# Patient Record
Sex: FEMALE | Race: BLACK OR AFRICAN AMERICAN | NOT HISPANIC OR LATINO | URBAN - METROPOLITAN AREA
[De-identification: names, ages, dates, MRNs, and addresses within clinical notes are randomized per-mention and may not be internally consistent; named-entity substitution may affect disease eponyms.]

---

## 2023-04-18 ENCOUNTER — APPOINTMENT (OUTPATIENT)
Dept: LAB | Facility: LAB | Age: 38
End: 2023-04-18
Payer: OTHER GOVERNMENT

## 2023-04-18 LAB
ABO GROUP (TYPE) IN BLOOD: NORMAL
ANTIBODY SCREEN: NORMAL
ERYTHROCYTE DISTRIBUTION WIDTH (RATIO) BY AUTOMATED COUNT: 14.6 % (ref 11.5–14.5)
ERYTHROCYTE MEAN CORPUSCULAR HEMOGLOBIN CONCENTRATION (G/DL) BY AUTOMATED: 30.8 G/DL (ref 32–36)
ERYTHROCYTE MEAN CORPUSCULAR VOLUME (FL) BY AUTOMATED COUNT: 86 FL (ref 80–100)
ERYTHROCYTES (10*6/UL) IN BLOOD BY AUTOMATED COUNT: 3.98 X10E12/L (ref 4–5.2)
ESTIMATED AVERAGE GLUCOSE FOR HBA1C: 88 MG/DL
FERRITIN, PREGNANCY: 22 UG/L
FOLATE, SERUM, PREGNANCY: 11.9 NG/ML
HEMATOCRIT (%) IN BLOOD BY AUTOMATED COUNT: 34.1 % (ref 36–46)
HEMOGLOBIN (G/DL) IN BLOOD: 10.5 G/DL (ref 12–16)
HEMOGLOBIN A1C/HEMOGLOBIN TOTAL IN BLOOD: 4.7 %
HEPATITIS B VIRUS SURFACE AG PRESENCE IN SERUM: NONREACTIVE
HEPATITIS C VIRUS AB PRESENCE IN SERUM: NONREACTIVE
HIV 1/ 2 AG/AB SCREEN: NONREACTIVE
IRON (UG/DL) IN SER/PLAS IN PREGNANCY: 41 UG/DL
IRON BINDING CAPACITY (UG/DL) IN PREGNANCY: 452 UG/DL
IRON SATURATION (%) IN PREGNANCY: 9 %
LEUKOCYTES (10*3/UL) IN BLOOD BY AUTOMATED COUNT: 9.5 X10E9/L (ref 4.4–11.3)
NRBC (PER 100 WBCS) BY AUTOMATED COUNT: 0 /100 WBC (ref 0–0)
PLATELETS (10*3/UL) IN BLOOD AUTOMATED COUNT: 332 X10E9/L (ref 150–450)
REFLEX ADDED, ANEMIA PANEL: ABNORMAL
RH FACTOR: NORMAL
RUBELLA VIRUS IGG AB: NEGATIVE
SYPHILIS TOTAL AB: NONREACTIVE
VITAMIN B12, PREGNANCY: 218 PG/ML

## 2023-04-19 LAB
CHLAMYDIA TRACH., AMPLIFIED: NEGATIVE
N. GONORRHEA, AMPLIFIED: NEGATIVE
TRICHOMONAS VAGINALIS: NEGATIVE
URINE CULTURE: NORMAL

## 2023-04-20 LAB
HEMOGLOBIN A2: 2.8 %
HEMOGLOBIN A: 97 %
HEMOGLOBIN F: 0.2 %
HEMOGLOBIN IDENTIFICATION INTERPRETATION: NORMAL
PATH REVIEW-HGB IDENTIFICATION: NORMAL

## 2023-05-15 LAB
ERYTHROCYTE DISTRIBUTION WIDTH (RATIO) BY AUTOMATED COUNT: 14.5 % (ref 11.5–14.5)
ERYTHROCYTE MEAN CORPUSCULAR HEMOGLOBIN CONCENTRATION (G/DL) BY AUTOMATED: 30.8 G/DL (ref 32–36)
ERYTHROCYTE MEAN CORPUSCULAR VOLUME (FL) BY AUTOMATED COUNT: 86 FL (ref 80–100)
ERYTHROCYTES (10*6/UL) IN BLOOD BY AUTOMATED COUNT: 3.96 X10E12/L (ref 4–5.2)
FERRITIN, PREGNANCY: 21 UG/L
HEMATOCRIT (%) IN BLOOD BY AUTOMATED COUNT: 34.1 % (ref 36–46)
HEMOGLOBIN (G/DL) IN BLOOD: 10.5 G/DL (ref 12–16)
IRON (UG/DL) IN SER/PLAS IN PREGNANCY: 49 UG/DL
IRON BINDING CAPACITY (UG/DL) IN PREGNANCY: >499 UG/DL
IRON SATURATION (%) IN PREGNANCY: ABNORMAL %
LEUKOCYTES (10*3/UL) IN BLOOD BY AUTOMATED COUNT: 9.5 X10E9/L (ref 4.4–11.3)
NRBC (PER 100 WBCS) BY AUTOMATED COUNT: 0 /100 WBC (ref 0–0)
PLATELETS (10*3/UL) IN BLOOD AUTOMATED COUNT: 342 X10E9/L (ref 150–450)
REFLEX ADDED, ANEMIA PANEL: ABNORMAL
SYPHILIS TOTAL AB: NONREACTIVE

## 2023-05-16 LAB
FOLATE, SERUM, PREGNANCY: 8.8 NG/ML
VITAMIN B12, PREGNANCY: 222 PG/ML

## 2023-06-15 LAB
ERYTHROCYTE DISTRIBUTION WIDTH (RATIO) BY AUTOMATED COUNT: 15.5 % (ref 11.5–14.5)
ERYTHROCYTE MEAN CORPUSCULAR HEMOGLOBIN CONCENTRATION (G/DL) BY AUTOMATED: 31 G/DL (ref 32–36)
ERYTHROCYTE MEAN CORPUSCULAR VOLUME (FL) BY AUTOMATED COUNT: 85 FL (ref 80–100)
ERYTHROCYTES (10*6/UL) IN BLOOD BY AUTOMATED COUNT: 4.02 X10E12/L (ref 4–5.2)
FERRITIN, PREGNANCY: 12 UG/L
FOLATE, SERUM, PREGNANCY: 11 NG/ML
HEMATOCRIT (%) IN BLOOD BY AUTOMATED COUNT: 34.2 % (ref 36–46)
HEMOGLOBIN (G/DL) IN BLOOD: 10.6 G/DL (ref 12–16)
IRON (UG/DL) IN SER/PLAS IN PREGNANCY: 156 UG/DL
IRON BINDING CAPACITY (UG/DL) IN PREGNANCY: 498 UG/DL
IRON SATURATION (%) IN PREGNANCY: 31 %
LEUKOCYTES (10*3/UL) IN BLOOD BY AUTOMATED COUNT: 9.9 X10E9/L (ref 4.4–11.3)
NRBC (PER 100 WBCS) BY AUTOMATED COUNT: 0 /100 WBC (ref 0–0)
PLATELETS (10*3/UL) IN BLOOD AUTOMATED COUNT: 313 X10E9/L (ref 150–450)
REFLEX ADDED, ANEMIA PANEL: ABNORMAL
VITAMIN B12, PREGNANCY: 218 PG/ML

## 2023-08-02 LAB — GROUP B STREP SCREEN: NORMAL

## 2023-10-05 PROBLEM — D64.9 ANEMIA: Status: ACTIVE | Noted: 2023-10-05

## 2023-10-05 PROBLEM — R73.09 ABNORMAL GLUCOSE: Status: ACTIVE | Noted: 2023-10-05

## 2023-10-05 PROBLEM — O09.32 LATE PRENATAL CARE IN SECOND TRIMESTER (HHS-HCC): Status: ACTIVE | Noted: 2023-10-05

## 2023-10-05 PROBLEM — Z34.90 PREGNANCY (HHS-HCC): Status: ACTIVE | Noted: 2023-10-05

## 2023-10-05 PROBLEM — Z3A.25: Status: ACTIVE | Noted: 2023-10-05

## 2023-10-05 PROBLEM — O09.529 AMA (ADVANCED MATERNAL AGE) MULTIGRAVIDA 35+ (HHS-HCC): Status: ACTIVE | Noted: 2023-10-05

## 2023-10-05 PROBLEM — Z86.39: Status: ACTIVE | Noted: 2023-10-05

## 2023-10-05 PROBLEM — O24.419 GESTATIONAL DIABETES (HHS-HCC): Status: ACTIVE | Noted: 2023-10-05

## 2023-10-05 PROBLEM — O24.414 INSULIN CONTROLLED GESTATIONAL DIABETES MELLITUS (GDM) DURING PREGNANCY, ANTEPARTUM (HHS-HCC): Status: ACTIVE | Noted: 2023-10-05

## 2023-10-05 PROBLEM — Z87.59 HISTORY OF SPONTANEOUS ABORTION: Status: ACTIVE | Noted: 2023-10-05

## 2023-10-05 PROBLEM — R87.622 LGSIL PAP SMEAR OF VAGINA: Status: ACTIVE | Noted: 2023-10-05

## 2023-10-05 RX ORDER — ACETAMINOPHEN 500 MG
TABLET ORAL
COMMUNITY

## 2023-10-05 RX ORDER — BLOOD SUGAR DIAGNOSTIC
STRIP MISCELLANEOUS 4 TIMES DAILY
COMMUNITY

## 2023-10-05 RX ORDER — PEN NEEDLE, DIABETIC 30 GX3/16"
NEEDLE, DISPOSABLE MISCELLANEOUS
COMMUNITY

## 2023-10-05 RX ORDER — PRENATAL VIT NO.126/IRON/FOLIC 28MG-0.8MG
TABLET ORAL
COMMUNITY

## 2023-10-05 RX ORDER — ISOPROPYL ALCOHOL 70 ML/100ML
SWAB TOPICAL
COMMUNITY

## 2023-10-05 RX ORDER — LANCETS
EACH MISCELLANEOUS
COMMUNITY

## 2023-10-08 PROBLEM — Z86.32 HISTORY OF GESTATIONAL DIABETES: Status: ACTIVE | Noted: 2023-10-08

## 2023-10-09 ENCOUNTER — ROUTINE PRENATAL (OUTPATIENT)
Dept: MATERNAL FETAL MEDICINE | Facility: HOSPITAL | Age: 38
End: 2023-10-09
Payer: OTHER GOVERNMENT

## 2023-10-09 VITALS
HEIGHT: 65 IN | SYSTOLIC BLOOD PRESSURE: 116 MMHG | DIASTOLIC BLOOD PRESSURE: 82 MMHG | BODY MASS INDEX: 32.65 KG/M2 | WEIGHT: 196 LBS

## 2023-10-09 DIAGNOSIS — Z86.59 HISTORY OF DEPRESSION: ICD-10-CM

## 2023-10-09 DIAGNOSIS — Z86.32 HISTORY OF GESTATIONAL DIABETES: ICD-10-CM

## 2023-10-09 PROCEDURE — 99214 OFFICE O/P EST MOD 30 MIN: CPT | Performed by: NURSE PRACTITIONER

## 2023-10-09 PROCEDURE — G0463 HOSPITAL OUTPT CLINIC VISIT: HCPCS | Performed by: NURSE PRACTITIONER

## 2023-10-09 ASSESSMENT — ENCOUNTER SYMPTOMS
NEUROLOGICAL NEGATIVE: 0
CONSTITUTIONAL NEGATIVE: 0
CARDIOVASCULAR NEGATIVE: 0
ALLERGIC/IMMUNOLOGIC NEGATIVE: 0
EYES NEGATIVE: 0
HEMATOLOGIC/LYMPHATIC NEGATIVE: 0
MUSCULOSKELETAL NEGATIVE: 0
GASTROINTESTINAL NEGATIVE: 0
ENDOCRINE NEGATIVE: 0
RESPIRATORY NEGATIVE: 0
PSYCHIATRIC NEGATIVE: 0

## 2023-10-09 ASSESSMENT — PAIN SCALES - GENERAL: PAINLEVEL: 0-NO PAIN

## 2023-10-09 NOTE — ASSESSMENT & PLAN NOTE
- 22  -Feeding-breastfeed, milk supply well-established, this is going well. No concerns    -Mood- stable, on sertraline   -Contraception- abstinence, discussed waiting at least 1 year before TTC. Pt does not plan on having other children   -Well-woman care- Last PAP 2023 -LSIL neg HPV, plan for repeat PAP x 1 year  -Bleeding- spotting

## 2023-10-09 NOTE — PROGRESS NOTES
"Pt here for PP visit following  on 2023.     Pt reports doing well overall. Feels her mood is stable.     Continues to breastfeed and this is going well.     Bleeding comes and goes. Some spotting and sometimes a lighter flow and then it will stop for a few days. Normal voiding and bowel movements.     Contraception plan- currently planning abstinence.      Last PAP 2023- LSIL, - HPV    Physical Exam   Visit Vitals  /82 (BP Cuff Size: Adult)   Ht 1.651 m (5' 5\")   Wt 88.9 kg (196 lb)   LMP  (LMP Unknown)   Breastfeeding Yes   BMI 32.62 kg/m²   OB Status Recent pregnancy   Smoking Status Never   BSA 2.02 m²      Gen-NAD   Cardiac- good peripheral perfusion  Respiratory- non-labored breathing   Abdomen- soft, non-tender  Pelvic- declined     Problem List Items Addressed This Visit       Postpartum care following vaginal delivery - Primary    Current Assessment & Plan     - 23  -Feeding-breastfeed, milk supply well-established, this is going well. No concerns    -Mood- stable, on sertraline   -Contraception- abstinence, discussed waiting at least 1 year before TTC. Pt does not plan on having other children   -Well-woman care- Last PAP 2023 -LSIL neg HPV, plan for repeat PAP x 1 year  -Bleeding- spotting          History of gestational diabetes    Current Assessment & Plan     -neg 2hr gtt, completed prior to hospital discharge   -Increased risk of GDM in future pregnancy discussed and overall lifetime risk   -FU with PCP q3 yrs for A1C and TSH recommended          History of depression    Current Assessment & Plan     - H/O past depression  -On sertraline 50mg, symptoms stable            May consider IUD insertion- advised can schedule with this CNP for [facility]  RTC PRN    Vandana Magaña, APRN-CNP    "

## 2023-10-09 NOTE — ASSESSMENT & PLAN NOTE
-neg 2hr gtt, completed prior to hospital discharge   -Increased risk of GDM in future pregnancy discussed and overall lifetime risk   -FU with PCP q3 yrs for A1C and TSH recommended